# Patient Record
(demographics unavailable — no encounter records)

---

## 2024-11-06 NOTE — HISTORY OF PRESENT ILLNESS
[de-identified] : Reports ongoing lower right back pain x 6 months.  she believes it is her kidney.  she states she also has urinary frequency.  denies burning.  pain does not radiate.  She has RA and OA in lumbar spine she suffered a fall 6 months ago and had injury to chest/breast/ribs.

## 2025-01-06 NOTE — OB HISTORY
[Vaginal ___] : [unfilled] vaginal delivery(s) [Definite ___ (Date)] : the last menstrual period was [unfilled] [Abnormal Pap Smear] : normal pap smear [Taking Estrogens] : is not taking estrogen replacement [Sexually Active] : is not sexually active [FreeTextEntry1] : Largest baby 9.5 lbs

## 2025-01-06 NOTE — DISCUSSION/SUMMARY
[FreeTextEntry1] : JOVAN is a 57 year female who presents for On exam, negative CST, normal PVR, no POP.   [] []   f/u All questions answered.

## 2025-01-06 NOTE — ADDENDUM
[FreeTextEntry1] : This note was written by Miracle Roberts, acting as the  for Dr. Amin. This note accurately reflects the work and decisions made by Dr. Amin.

## 2025-01-06 NOTE — HISTORY OF PRESENT ILLNESS
[Vaginal Wall Prolapse] : no [Rectal Prolapse] : no [Unable To Restrain Bowel Movement] : no [Urinary Frequency] : no [Feelings Of Urinary Urgency] : mild [x3+] : nocturia three or more  times a night [Urinary Tract Infection] : mild [Constipation Obstructed Defecation] : no [] : no [Pelvic Pain] : no [Vaginal Pain] : no [FreeTextEntry1] : JOVAN is a 57 year female who presents for   Former smoker, 1/2 pack daily.   US Kidneys Bladder 2024 - No hydronephrosis or renal stone. 2.3 cm simple left renal cyst. Moderate postvoid residual. Postvoid volume 109 cc.   Daytime frequency: Yes Nocturia: 5 episodes per night  Urinary urgency: Yes Leakage of urine with urgency: No Leakage of urine with coughing sneezing laughing: No Sensation of incomplete bladder emptying: Yes History of frequent urinary tract infections: No History of hematuria: No Previous treatment: None Vaginal symptoms: Denies pelvic pain, Denies bulge Bowel symptoms: Denies constipation     OB: 5  GYN: LMP 7 years ago, Unknown last pap, H/o normal paps, No estrogen use PMH: Obesity PSH: Knee Meds: Rinvoq, Methotrexate, Folic acid, Omeprazole  Allx: NKDA

## 2025-01-13 NOTE — REASON FOR VISIT
[Initial Visit ___] : an initial visit for [unfilled] [Questionnaire Received] : Patient questionnaire received [Intake Form Reviewed] : Patient intake form with past medical history, surgical history, family history and social history reviewed today [Cannot Empty Bladder] : cannot empty bladder

## 2025-01-13 NOTE — PHYSICAL EXAM
[Chaperone Present] : A chaperone was present in the examining room during all aspects of the physical examination [47830] : A chaperone was present during the pelvic exam. [No Acute Distress] : in no acute distress [Well developed] : well developed [Well Nourished] : ~L well nourished [Oriented x3] : oriented to person, place, and time [No Edema] : ~T edema was not present [Warm and Dry] : was warm and dry to touch [Vulvar Atrophy] : vulvar atrophy [Labia Majora] : were normal [Labia Minora] : were normal [Normal Appearance] : general appearance was normal [Atrophy] : atrophy [Soft] :  the cervix was soft [Uterine Adnexae] : were not tender and not enlarged [Normal] : no abnormalities [Post Void Residual ____ml] : post void residual was [unfilled] ml

## 2025-01-13 NOTE — PHYSICAL EXAM
[Chaperone Present] : A chaperone was present in the examining room during all aspects of the physical examination [87108] : A chaperone was present during the pelvic exam. [No Acute Distress] : in no acute distress [Well developed] : well developed [Well Nourished] : ~L well nourished [Oriented x3] : oriented to person, place, and time [No Edema] : ~T edema was not present [Warm and Dry] : was warm and dry to touch [Vulvar Atrophy] : vulvar atrophy [Labia Majora] : were normal [Labia Minora] : were normal [Normal Appearance] : general appearance was normal [Atrophy] : atrophy [Soft] :  the cervix was soft [Uterine Adnexae] : were not tender and not enlarged [Normal] : no abnormalities [Post Void Residual ____ml] : post void residual was [unfilled] ml

## 2025-01-13 NOTE — PROCEDURE
[Straight Catheterization] : insertion of a straight catheter [Urinary Frequency] : urinary frequency [Patient] : the patient [___ Fr Straight Tip] : a [unfilled] in [place] straight tip catheter [None] : there were no complications with the catheter insertion [Clear] : clear [No Complications] : no complications [Tolerated Well] : the patient tolerated the procedure well [Post procedure instructions and information given] : Post procedure instructions and information were given and reviewed with patient. [0] : 0

## 2025-06-04 NOTE — PHYSICAL EXAM
[de-identified] : Right Ankle:  Range of Motion in Degrees: 	                                                     Claimant:	                Normal:	 Dorsiflexion (Active)	                  40-degrees	            40-degrees	 Dorsiflexion (Passive)	                  40-degrees	            40-degrees	 Plantar (Active)	                          40-degrees	            40-degrees	 Plantar (Passive)	                          40-degrees	            40-degrees	 Inversion (Active)	                          30-degrees	            30-degrees	 Inversion (Passive)	                          30-degrees	            30-degrees	 Eversion  (Active)	                          20-degrees	            20-degrees	 Eversion (Passive) 	                          20-degrees	            20-degrees	  No weakness in dorsiflexion, plantar flexion, inversion or eversion.  Normal sensation.  No tenderness over the medial or lateral ligaments.  No tenderness over the DLES.  No evidence of instability.  Negative anterior drawer sign.  No medial or lateral bony tenderness.  No proximal fibular tenderness.  No anterior, posterior, medial or lateral tendon tenderness.  No intra-articular swelling.  No extra-articular swelling, edema or tenderness.  No tenderness over the plantar aspect of the os calcis.  2+ DP and PT pulses. Skin is intact.  No rashes, scars or lesions.    Left Ankle:  Range of Motion in Degrees: 	                                                     Claimant:	                Normal:	 Dorsiflexion (Active)	                  40-degrees	            40-degrees	 Dorsiflexion (Passive)	                  40-degrees	            40-degrees	 Plantar (Active)	                          40-degrees	            40-degrees	 Plantar (Passive)	                          40-degrees	            40-degrees	 Inversion (Active)	                          30-degrees	            30-degrees	 Inversion (Passive)	                          30-degrees	            30-degrees	 Eversion  (Active)	                          20-degrees	            20-degrees	 Eversion (Passive) 	                          20-degrees	            20-degrees	  No weakness in dorsiflexion, plantar flexion, inversion or eversion.  Normal sensation.  Diffuse swelling.  No tenderness over the medial or lateral ligaments.  No tenderness over the DLES.  No evidence of instability. Negative anterior drawer sign.  No medial or lateral bony tenderness.  No proximal fibular tenderness.  No anterior, posterior, medial or lateral tendon tenderness.  No intra-articular swelling.  No extra-articular swelling, edema or tenderness.  No tenderness over the plantar aspect of the os calcis.  2+ DP and PT pulses. Skin is intact.  No rashes, scars or lesions.  Tenderness along the Achilles tendon. Tenderness along the course of the peroneal tendon. No palpable defects of the Achilles.    [de-identified] : Ambulating with a slightly antalgic to antalgic gait.  Station:  Normal.  [de-identified] : Appearance:  Well-developed, well-nourished female in no acute distress.   [de-identified] : Radiographs, which were taken in the office today, two views of the left foot, show no obvious osseous abnormality.

## 2025-06-04 NOTE — DISCUSSION/SUMMARY
[de-identified] : At this time, due to Achilles and peroneal tendonitis of the left foot, I recommended a low-profile brace, ice, topical anti-inflammatory and physical therapy. She will be reassessed in three to four weeks.

## 2025-06-04 NOTE — DISCUSSION/SUMMARY
[de-identified] : At this time, due to Achilles and peroneal tendonitis of the left foot, I recommended a low-profile brace, ice, topical anti-inflammatory and physical therapy. She will be reassessed in three to four weeks.

## 2025-06-04 NOTE — HISTORY OF PRESENT ILLNESS
[de-identified] : The patient comes in today for her left foot. She hasn't been seen in quite a while. She is very active working at [employer] and she is on her feet a lot.

## 2025-06-04 NOTE — ADDENDUM
[FreeTextEntry1] : This note was written by Peg Stephenson on 06/04/2025 acting as a scribe for TONA VELA III, MD

## 2025-06-04 NOTE — HISTORY OF PRESENT ILLNESS
[de-identified] : The patient comes in today for her left foot. She hasn't been seen in quite a while. She is very active working at [employer] and she is on her feet a lot.

## 2025-06-04 NOTE — PHYSICAL EXAM
[de-identified] : Right Ankle:  Range of Motion in Degrees: 	                                                     Claimant:	                Normal:	 Dorsiflexion (Active)	                  40-degrees	            40-degrees	 Dorsiflexion (Passive)	                  40-degrees	            40-degrees	 Plantar (Active)	                          40-degrees	            40-degrees	 Plantar (Passive)	                          40-degrees	            40-degrees	 Inversion (Active)	                          30-degrees	            30-degrees	 Inversion (Passive)	                          30-degrees	            30-degrees	 Eversion  (Active)	                          20-degrees	            20-degrees	 Eversion (Passive) 	                          20-degrees	            20-degrees	  No weakness in dorsiflexion, plantar flexion, inversion or eversion.  Normal sensation.  No tenderness over the medial or lateral ligaments.  No tenderness over the DLES.  No evidence of instability.  Negative anterior drawer sign.  No medial or lateral bony tenderness.  No proximal fibular tenderness.  No anterior, posterior, medial or lateral tendon tenderness.  No intra-articular swelling.  No extra-articular swelling, edema or tenderness.  No tenderness over the plantar aspect of the os calcis.  2+ DP and PT pulses. Skin is intact.  No rashes, scars or lesions.    Left Ankle:  Range of Motion in Degrees: 	                                                     Claimant:	                Normal:	 Dorsiflexion (Active)	                  40-degrees	            40-degrees	 Dorsiflexion (Passive)	                  40-degrees	            40-degrees	 Plantar (Active)	                          40-degrees	            40-degrees	 Plantar (Passive)	                          40-degrees	            40-degrees	 Inversion (Active)	                          30-degrees	            30-degrees	 Inversion (Passive)	                          30-degrees	            30-degrees	 Eversion  (Active)	                          20-degrees	            20-degrees	 Eversion (Passive) 	                          20-degrees	            20-degrees	  No weakness in dorsiflexion, plantar flexion, inversion or eversion.  Normal sensation.  Diffuse swelling.  No tenderness over the medial or lateral ligaments.  No tenderness over the DLES.  No evidence of instability. Negative anterior drawer sign.  No medial or lateral bony tenderness.  No proximal fibular tenderness.  No anterior, posterior, medial or lateral tendon tenderness.  No intra-articular swelling.  No extra-articular swelling, edema or tenderness.  No tenderness over the plantar aspect of the os calcis.  2+ DP and PT pulses. Skin is intact.  No rashes, scars or lesions.  Tenderness along the Achilles tendon. Tenderness along the course of the peroneal tendon. No palpable defects of the Achilles.    [de-identified] : Ambulating with a slightly antalgic to antalgic gait.  Station:  Normal.  [de-identified] : Appearance:  Well-developed, well-nourished female in no acute distress.   [de-identified] : Radiographs, which were taken in the office today, two views of the left foot, show no obvious osseous abnormality.